# Patient Record
Sex: MALE | Race: WHITE | Employment: FULL TIME | URBAN - METROPOLITAN AREA
[De-identification: names, ages, dates, MRNs, and addresses within clinical notes are randomized per-mention and may not be internally consistent; named-entity substitution may affect disease eponyms.]

---

## 2017-11-27 ENCOUNTER — OFFICE VISIT (OUTPATIENT)
Dept: FAMILY MEDICINE | Facility: CLINIC | Age: 32
End: 2017-11-27
Payer: COMMERCIAL

## 2017-11-27 VITALS
HEIGHT: 75 IN | OXYGEN SATURATION: 97 % | HEART RATE: 101 BPM | TEMPERATURE: 98.8 F | SYSTOLIC BLOOD PRESSURE: 130 MMHG | DIASTOLIC BLOOD PRESSURE: 70 MMHG | BODY MASS INDEX: 25.48 KG/M2 | WEIGHT: 204.9 LBS

## 2017-11-27 DIAGNOSIS — J10.1 INFLUENZA A: ICD-10-CM

## 2017-11-27 DIAGNOSIS — R07.0 THROAT PAIN: Primary | ICD-10-CM

## 2017-11-27 LAB
FLUAV+FLUBV AG SPEC QL: NEGATIVE
FLUAV+FLUBV AG SPEC QL: POSITIVE
SPECIMEN SOURCE: ABNORMAL

## 2017-11-27 PROCEDURE — 99213 OFFICE O/P EST LOW 20 MIN: CPT | Performed by: HOSPITALIST

## 2017-11-27 PROCEDURE — 87804 INFLUENZA ASSAY W/OPTIC: CPT | Performed by: HOSPITALIST

## 2017-11-27 RX ORDER — OSELTAMIVIR PHOSPHATE 75 MG/1
75 CAPSULE ORAL 2 TIMES DAILY
Qty: 10 CAPSULE | Refills: 0 | Status: SHIPPED | OUTPATIENT
Start: 2017-11-27 | End: 2019-05-01

## 2017-11-27 NOTE — LETTER
November 27, 2017      To Whom It May Concern:      Jeffry Peoples was seen in our Emergency Department today, 11/27/17.  I expect his condition to improve over the next 2 days.  He may return to work when improved.    Sincerely,        Sixto Tovar MD

## 2017-11-27 NOTE — PROGRESS NOTES
"  SUBJECTIVE:   Jeffry Peoples is a 32 year old male who presents to clinic today for the following health issues:      Acute Illness   Acute illness concerns: Flu  Onset: 3 days     Fever: YES    Chills/Sweats: YES    Headache (location?): no    Sinus Pressure:no    Conjunctivitis:  no    Ear Pain: no    Rhinorrhea: no    Congestion: YES    Sore Throat: no     Cough: YES    Wheeze: no     Decreased Appetite: no     Nausea: no    Vomiting: no    Diarrhea:  YES    Dysuria/Freq.: no     Fatigue/Achiness: YES    Sick/Strep Exposure: no     Therapies Tried and outcome: Sudafed, IBU, dayquil         No Known Allergies    No past medical history on file.      No current outpatient prescriptions on file prior to visit.  No current facility-administered medications on file prior to visit.     Social History   Substance Use Topics     Smoking status: Former Smoker     Smokeless tobacco: Never Used     Alcohol use Yes       ROS:  Consitutional: As above  ENT: As above  Respiratory: As above    OBJECTIVE:  /70 (BP Location: Right arm, Patient Position: Chair, Cuff Size: Adult Large)  Pulse 101  Temp 98.8  F (37.1  C) (Oral)  Ht 6' 3.25\" (1.911 m)  Wt 204 lb 14.4 oz (92.9 kg)  SpO2 97%  BMI 25.44 kg/m2  GENERAL APPEARANCE: healthy, alert and moderate distress  EYES: conjunctiva clear  EARS:no cerumen.   Ear canals no erythema, TM's intact no erythema .    NOSE/MOUTH: Nose and mouth is normal, no erythema or lesions  THROAT: no erythema w/ no tonsillar enlargement . no exudates  NECK: supple, nontender, no lymphadenopathy  RESP: lungs clear to auscultation - no rales, rhonchi or wheezes  CV: regular rates and rhythm, normal S1 S2, no murmur noted  NEURO: awake, alert        Recent Results (from the past 168 hour(s))   Influenza A/B antigen    Collection Time: 11/27/17  4:15 PM   Result Value Ref Range    Influenza A/B Agn Specimen Nasal     Influenza A Positive (A) NEG^Negative    Influenza B Negative NEG^Negative "        ASSESSMENT:     ICD-10-CM    1. Throat pain R07.0 Influenza A/B antigen   2. Influenza A J10.1 oseltamivir (TAMIFLU) 75 MG capsule         PLAN:    Will start tamiflu,Tylenol and ibuprofen prn for  Pain or fever  Lots of rest and fluids.  Follow up in 3-7 days if not better or sooner if getting worse .    Sixto Tovar MD

## 2017-11-27 NOTE — MR AVS SNAPSHOT
"              After Visit Summary   2017    Jeffry Peoples    MRN: [id number]           Patient Information     Date Of Birth          1985        Visit Information        Provider Department      2017 3:30 PM Sixto Tovar MD [facility]        Today's Diagnoses     Throat pain    -  1    Influenza A           Follow-ups after your visit        Who to contact     If you have questions or need follow up information about today's clinic visit or your schedule please contact [facility] directly at [phone].  Normal or non-critical lab and imaging results will be communicated to you by [employer], letter or phone within 4 business days after the clinic has received the results. If you do not hear from us within 7 days, please contact the clinic through [employer] or phone. If you have a critical or abnormal lab result, we will notify you by phone as soon as possible.  Submit refill requests through [employer] or call your pharmacy and they will forward the refill request to us. Please allow 3 business days for your refill to be completed.          Additional Information About Your Visit        MyChart Information     [employer] lets you send messages to your doctor, view your test results, renew your prescriptions, schedule appointments and more. To sign up, go to [email or web address] . Click on \"Log in\" on the left side of the screen, which will take you to the Welcome page. Then click on \"Sign up Now\" on the right side of the page.     You will be asked to enter the access code listed below, as well as some personal information. Please follow the directions to create your username and password.     Your access code is: [email or web address]  Expires: 2018  4:51 PM     Your access code will  in 90 days. If you need help or a new code, please call your [facility] or [phone].        Care EveryWhere ID     This is your Care EveryWhere ID. This could be " "used by other organizations to access your [facility] medical records  [id number]        Your Vitals Were     Pulse Temperature Height Pulse Oximetry BMI (Body Mass Index)       101 98.8  F (37.1  C) (Oral) 6' 3.25\" (1.911 m) 97% 25.44 kg/m2        Blood Pressure from Last 3 Encounters:   11/27/17 130/70   08/30/16 133/83    Weight from Last 3 Encounters:   11/27/17 204 lb 14.4 oz (92.9 kg)              We Performed the Following     Influenza A/B antigen          Today's Medication Changes          These changes are accurate as of: 11/27/17  4:51 PM.  If you have any questions, ask your nurse or doctor.               Start taking these medicines.        Dose/Directions    oseltamivir 75 MG capsule   Commonly known as:  TAMIFLU   Used for:  Influenza A   Started by:  Sixto Tovar MD        Dose:  75 mg   Take 1 capsule (75 mg) by mouth 2 times daily   Quantity:  10 capsule   Refills:  0            Where to get your medicines      These medications were sent to [place] [id number] [place] [facility] [place] [id number] [facility]  [address] [facility] [address]     Phone:  [phone]     oseltamivir 75 MG capsule                Primary Care Provider Fax #    Physician No Ref-Primary [phone]       No address on file        Equal Access to Services     MIKEY CHUNG AH: Saqib perez Sosanket, waaxda luqadaha, qaybta kaalmada adeegyada, manolo martinez. So [facility] [phone].    ATENCIÓN: Si habla español, tiene a amaya disposición servicios gratuitos de asistencia lingüística. Llame al [phone].    We comply with applicable federal civil rights laws and [place] laws. We do not discriminate on the basis of race, color, national origin, age, disability, sex, sexual orientation, or gender identity.            Thank you!     Thank you for choosing [facility]  for your care. Our goal is always to provide you with excellent care. Hearing back from our " patients is one way we can continue to improve our services. Please take a few minutes to complete the written survey that you may receive in the mail after your visit with us. Thank you!             Your Updated Medication List - Protect others around you: Learn how to safely use, store and throw away your medicines at www.disposemymeds.org.          This list is accurate as of: 11/27/17  4:51 PM.  Always use your most recent med list.                   Brand Name Dispense Instructions for use Diagnosis    oseltamivir 75 MG capsule    TAMIFLU    10 capsule    Take 1 capsule (75 mg) by mouth 2 times daily    Influenza A

## 2017-11-27 NOTE — NURSING NOTE
"Chief Complaint   Patient presents with     URI       Initial /70 (BP Location: Right arm, Patient Position: Chair, Cuff Size: Adult Large)  Pulse 101  Temp 98.8  F (37.1  C) (Oral)  Ht 6' 3.25\" (1.911 m)  Wt 204 lb 14.4 oz (92.9 kg)  SpO2 97%  BMI 25.44 kg/m2 Estimated body mass index is 25.44 kg/(m^2) as calculated from the following:    Height as of this encounter: 6' 3.25\" (1.911 m).    Weight as of this encounter: 204 lb 14.4 oz (92.9 kg).  Medication Reconciliation: complete      Health Maintenance addressed:  NONE    N/a    ANTOINETTE Wade        "

## 2019-03-20 ENCOUNTER — OFFICE VISIT (OUTPATIENT)
Dept: FAMILY MEDICINE | Facility: CLINIC | Age: 34
End: 2019-03-20
Payer: COMMERCIAL

## 2019-03-20 VITALS
WEIGHT: 213.8 LBS | RESPIRATION RATE: 20 BRPM | DIASTOLIC BLOOD PRESSURE: 80 MMHG | OXYGEN SATURATION: 100 % | BODY MASS INDEX: 26.04 KG/M2 | HEIGHT: 76 IN | TEMPERATURE: 98 F | SYSTOLIC BLOOD PRESSURE: 134 MMHG | HEART RATE: 122 BPM

## 2019-03-20 DIAGNOSIS — M62.830 BACK MUSCLE SPASM: ICD-10-CM

## 2019-03-20 DIAGNOSIS — H81.12 BENIGN PAROXYSMAL POSITIONAL VERTIGO OF LEFT EAR: Primary | ICD-10-CM

## 2019-03-20 PROCEDURE — 99214 OFFICE O/P EST MOD 30 MIN: CPT | Performed by: NURSE PRACTITIONER

## 2019-03-20 RX ORDER — CYCLOBENZAPRINE HCL 5 MG
5 TABLET ORAL 3 TIMES DAILY PRN
Qty: 30 TABLET | Refills: 1 | Status: SHIPPED | OUTPATIENT
Start: 2019-03-20

## 2019-03-20 RX ORDER — MECLIZINE HYDROCHLORIDE 25 MG/1
25 TABLET ORAL 3 TIMES DAILY PRN
Qty: 30 TABLET | Refills: 0 | Status: SHIPPED | OUTPATIENT
Start: 2019-03-20

## 2019-03-20 ASSESSMENT — MIFFLIN-ST. JEOR: SCORE: 2017.32

## 2019-03-20 NOTE — PROGRESS NOTES
"  SUBJECTIVE:   Jeffry Peoples is a 33 year old male who presents to clinic today for the following health issues:    Dizziness      Duration: 6 days     Description   Feeling faint:  no   Feeling like the surroundings are moving: YES- when the dizziness occurs   Loss of consciousness or falls: no     Intensity:  Mild and intermittant     Accompanying signs and symptoms:   Nausea/vomitting: YES- off and on   Palpitations: no   Weakness in arms or legs: YES  Vision or speech changes: no   Ringing in ears (Tinnitus): no   Hearing loss related to dizziness: no   Other (fevers/chills/sweating/dyspnea): YES- sweating     History (similar episodes/head trauma/previous evaluation/recent bleeding): None    Precipitating or alleviating factors (new meds/chemicals): under a lot of stress   Worse with activity/head movement: YES- a little bit     Therapies tried and outcome: None    Had diarrhea last week for four days.   Friday afternoon had onset of vertigo at work.    Still feeling dizzy and weak, but no where near as bad as the first day.     L upper back pain: sees a chiro, told he has a \"rib out\".  Feels like a knot.  At times pain can be sharp.        Problem list and histories reviewed & adjusted, as indicated.  Additional history: as documented    Current Outpatient Medications   Medication Sig Dispense Refill     oseltamivir (TAMIFLU) 75 MG capsule Take 1 capsule (75 mg) by mouth 2 times daily (Patient not taking: Reported on 3/20/2019) 10 capsule 0     No Known Allergies    Reviewed and updated as needed this visit by clinical staff  Tobacco  Allergies  Meds  Problems  Med Hx  Surg Hx  Fam Hx  Soc Hx        Reviewed and updated as needed this visit by Provider         ROS:  Constitutional, HEENT, cardiovascular, pulmonary, gi and gu and neuro systems are negative, except as otherwise noted.    OBJECTIVE:     /80 (BP Location: Right arm, Patient Position: Sitting, Cuff Size: Adult Large)   Pulse 122  " " Temp 98  F (36.7  C) (Oral)   Resp 20   Ht 1.924 m (6' 3.75\")   Wt 97 kg (213 lb 12.8 oz)   SpO2 100%   BMI 26.20 kg/m    Body mass index is 26.2 kg/m .  GENERAL: healthy, alert and no distress  EYES: Eyes grossly normal to inspection, EOMI, PERRL and conjunctivae and sclerae normal  HENT: ear canals and TM's normal, nose and mouth without ulcers or lesions  NECK: no adenopathy, no asymmetry, masses, or scars and thyroid normal to palpation  RESP: lungs clear to auscultation - no rales, rhonchi or wheezes  CV: regular rate and rhythm, normal S1 S2, no S3 or S4, no murmur, click or rub  NEURO: mentation intact, speech normal, Romberg abnormal and L sided nystagmus   MS: tightness over the L upper trapezius muscle    Diagnostic Test Results:  none     ASSESSMENT/PLAN:   1. Benign paroxysmal positional vertigo of left ear  Explained eply maneuver.   Ensure plenty of rest.  If not resolving over the next 2 weeks consider having him see vestibular therapy.   - meclizine (ANTIVERT) 25 MG tablet; Take 1 tablet (25 mg) by mouth 3 times daily as needed for dizziness  Dispense: 30 tablet; Refill: 0    2. Back muscle spasm  Heat, NSAID.    - cyclobenzaprine (FLEXERIL) 5 MG tablet; Take 1 tablet (5 mg) by mouth 3 times daily as needed for muscle spasms  Dispense: 30 tablet; Refill: 1      CHIDI Stafford [facility]    "

## 2019-03-20 NOTE — NURSING NOTE
"Chief Complaint   Patient presents with     Dizziness     Initial /80 (BP Location: Right arm, Patient Position: Sitting, Cuff Size: Adult Large)   Pulse 122   Temp 98  F (36.7  C) (Oral)   Resp 20   Ht 1.924 m (6' 3.75\")   Wt 97 kg (213 lb 12.8 oz)   SpO2 100%   BMI 26.20 kg/m   Estimated body mass index is 26.2 kg/m  as calculated from the following:    Height as of this encounter: 1.924 m (6' 3.75\").    Weight as of this encounter: 97 kg (213 lb 12.8 oz).  BP completed using cuff size large right arm    Lisa Magill, CMA    "

## 2019-03-20 NOTE — PATIENT INSTRUCTIONS
Patient Education     Benign Paroxysmal Positional Vertigo    Benign paroxysmal positional vertigo is a common condition. You feel as if the room is spinning after changing position, moving your head quickly, or even just rolling over in bed.  Vertigo is a false feeling of motion plus disorientation that makes it seem as though the room is spinning. A vertigo attack may cause sudden nausea, vomiting, and heavy sweating. Severe vertigo causes a loss of balance. You may even fall down.  Vertigo is caused by a problem with the inner ear. The inner ear is located behind the middle ear. It is a part of the balance center of the body. It contains small calcium particles within fluid-filled canals (semi-circular canals). These particles can move out of position. This may happen as a result of aging, head injury, or disease of the inner ear. Once that happens, moving your head in certain ways may cause the particles to stimulate the inner ear. This creates the feeling of vertigo.  An episode of vertigo may last seconds, minutes, or hours. Once you are over the first episode of vertigo, it may never return. Sometimes symptoms return off and on for several weeks or longer.  Home care  Follow these guidelines when caring for yourself at home:    Rest quietly in bed if your symptoms are severe. Change position slowly. There is usually 1 position that will feel best. This might be lying on 1 side or lying on your back with your head slightly raised on pillows. Until you have no symptoms, you are at a higher risk of falling. Let someone help you when you get up. Get rid of home hazards such as loose electrical cords and throw rugs. Don t walk in unfamiliar areas that are not lighted. Use night lights in bathrooms and kitchen areas.    Do not drive or work with dangerous machinery for 1 week after symptoms go away. This is in case symptoms return suddenly.    Take medicine as prescribed to relieve your symptoms. Unless another  medicine was prescribed for nausea, vomiting, and vertigo, you may use over-the-counter motion sickness medicine. Examples of this include meclizine and dimenhydrinate.  Follow-up care  Follow up with your healthcare provider, or as directed. Tell your provider about any ringing in your ear or hearing loss.  If you had a CT or MRI scan, a specialist will review it. You will be told of any new findings that may affect your care.  When to seek medical advice  Call your healthcare provider right away if any of these occur:    Vertigo gets worse even after taking prescribed medicine    Repeated vomiting even after taking prescribed medicine    Weakness that gets worse    Fainting    Severe headache or unusual drowsiness or confusion    Weakness of an arm or leg or 1 side of the face    Trouble walking    Trouble with speech or vision    Seizure    Trouble hearing    Fever of 100.4 F (38 C) or higher, or as directed by your healthcare provider    Fast heart rate    Chest pain   Date Last Reviewed: 11/1/2017 2000-2018 [employer]. [address] [place] [address], [place] [address]. All rights reserved. This information is not intended as a substitute for professional medical care. Always follow your healthcare professional's instructions.

## 2019-04-27 ENCOUNTER — OFFICE VISIT (OUTPATIENT)
Dept: URGENT CARE | Facility: URGENT CARE | Age: 34
End: 2019-04-27
Payer: COMMERCIAL

## 2019-04-27 VITALS
DIASTOLIC BLOOD PRESSURE: 82 MMHG | BODY MASS INDEX: 25.94 KG/M2 | HEART RATE: 112 BPM | RESPIRATION RATE: 16 BRPM | OXYGEN SATURATION: 99 % | SYSTOLIC BLOOD PRESSURE: 126 MMHG | WEIGHT: 213 LBS | TEMPERATURE: 99 F | HEIGHT: 76 IN

## 2019-04-27 DIAGNOSIS — J01.90 ACUTE SINUSITIS WITH SYMPTOMS > 10 DAYS: ICD-10-CM

## 2019-04-27 DIAGNOSIS — R07.0 THROAT PAIN: Primary | ICD-10-CM

## 2019-04-27 LAB
DEPRECATED S PYO AG THROAT QL EIA: NORMAL
SPECIMEN SOURCE: NORMAL

## 2019-04-27 PROCEDURE — 99213 OFFICE O/P EST LOW 20 MIN: CPT | Performed by: FAMILY MEDICINE

## 2019-04-27 PROCEDURE — 87880 STREP A ASSAY W/OPTIC: CPT | Performed by: FAMILY MEDICINE

## 2019-04-27 PROCEDURE — 87081 CULTURE SCREEN ONLY: CPT | Performed by: FAMILY MEDICINE

## 2019-04-27 RX ORDER — AZITHROMYCIN 250 MG/1
TABLET, FILM COATED ORAL
Qty: 6 TABLET | Refills: 0 | Status: SHIPPED | OUTPATIENT
Start: 2019-04-27 | End: 2019-05-01

## 2019-04-27 ASSESSMENT — MIFFLIN-ST. JEOR: SCORE: 2013.69

## 2019-04-27 NOTE — PROGRESS NOTES
SUBJECTIVE: Jeffry Peoples is a 33 year old male patient complaining of sinus congestion for 10 day(s).     OBJECTIVE: The patient appears alert and mild distress.   EARS: External ears normal. Canals clear. TM's normal.  NOSE/SINUS: positive findings: mucosa erythematous and swollen  Sinus palpation: Maxillary sinus nontender to palpation   THROAT: marked erythema   NECK:positive findings: few small anterior cervical nodes   CHEST: Clear    ASSESSMENT: Acute Sinusitis; I think thsa all his symptoms are from sinus infection. Now with ST. Strep negative.     Muscle pain in his neck . This is leading to headaches by recruitment of muscle.    PLAN: See orders.   In addition, I have suggested that the patient   .  [place] and relafen

## 2019-04-28 LAB
BACTERIA SPEC CULT: NORMAL
SPECIMEN SOURCE: NORMAL

## 2019-05-01 ENCOUNTER — OFFICE VISIT (OUTPATIENT)
Dept: FAMILY MEDICINE | Facility: CLINIC | Age: 34
End: 2019-05-01
Payer: COMMERCIAL

## 2019-05-01 VITALS
DIASTOLIC BLOOD PRESSURE: 88 MMHG | HEART RATE: 124 BPM | SYSTOLIC BLOOD PRESSURE: 120 MMHG | WEIGHT: 213.8 LBS | TEMPERATURE: 98.9 F | BODY MASS INDEX: 26.2 KG/M2 | RESPIRATION RATE: 16 BRPM

## 2019-05-01 DIAGNOSIS — R00.0 RACING HEART BEAT: ICD-10-CM

## 2019-05-01 DIAGNOSIS — R42 DIZZINESS: Primary | ICD-10-CM

## 2019-05-01 DIAGNOSIS — J01.90 ACUTE SINUSITIS WITH SYMPTOMS > 10 DAYS: ICD-10-CM

## 2019-05-01 LAB
ERYTHROCYTE [DISTWIDTH] IN BLOOD BY AUTOMATED COUNT: 11.5 % (ref 10–15)
HCT VFR BLD AUTO: 47.2 % (ref 40–53)
HGB BLD-MCNC: 16.7 G/DL (ref 13.3–17.7)
MCH RBC QN AUTO: 33.7 PG (ref 26.5–33)
MCHC RBC AUTO-ENTMCNC: 35.4 G/DL (ref 31.5–36.5)
MCV RBC AUTO: 95 FL (ref 78–100)
PLATELET # BLD AUTO: 291 10E9/L (ref 150–450)
RBC # BLD AUTO: 4.96 10E12/L (ref 4.4–5.9)
WBC # BLD AUTO: 7.5 10E9/L (ref 4–11)

## 2019-05-01 PROCEDURE — 85027 COMPLETE CBC AUTOMATED: CPT | Performed by: NURSE PRACTITIONER

## 2019-05-01 PROCEDURE — 99214 OFFICE O/P EST MOD 30 MIN: CPT | Performed by: NURSE PRACTITIONER

## 2019-05-01 PROCEDURE — 84443 ASSAY THYROID STIM HORMONE: CPT | Performed by: NURSE PRACTITIONER

## 2019-05-01 PROCEDURE — 80053 COMPREHEN METABOLIC PANEL: CPT | Performed by: NURSE PRACTITIONER

## 2019-05-01 PROCEDURE — 36415 COLL VENOUS BLD VENIPUNCTURE: CPT | Performed by: NURSE PRACTITIONER

## 2019-05-01 ASSESSMENT — ANXIETY QUESTIONNAIRES
7. FEELING AFRAID AS IF SOMETHING AWFUL MIGHT HAPPEN: NEARLY EVERY DAY
6. BECOMING EASILY ANNOYED OR IRRITABLE: NOT AT ALL
5. BEING SO RESTLESS THAT IT IS HARD TO SIT STILL: NOT AT ALL
1. FEELING NERVOUS, ANXIOUS, OR ON EDGE: NEARLY EVERY DAY
2. NOT BEING ABLE TO STOP OR CONTROL WORRYING: NEARLY EVERY DAY
GAD7 TOTAL SCORE: 15
3. WORRYING TOO MUCH ABOUT DIFFERENT THINGS: NEARLY EVERY DAY

## 2019-05-01 ASSESSMENT — PATIENT HEALTH QUESTIONNAIRE - PHQ9
5. POOR APPETITE OR OVEREATING: NEARLY EVERY DAY
SUM OF ALL RESPONSES TO PHQ QUESTIONS 1-9: 3

## 2019-05-01 NOTE — PROGRESS NOTES
SUBJECTIVE:   Jeffry Peoples is a 33 year old male who presents to clinic today for the following   health issues:      Dizziness  Onset: ongoing     Description:   Do you feel faint:  no   Does it feel like the surroundings (bed, room) are moving: no   Unsteady/off balance: YES  Have you passed out or fallen: no     Intensity: moderate    Progression of Symptoms:  waxing and waning    Accompanying Signs & Symptoms:  Heart palpitations: no   Nausea, vomiting: YES- nausea  Weakness in arms or legs: YES  Fatigue: YES  Vision or speech changes: no   Ringing in ears (Tinnitus): no   Hearing Loss: no     History:   Head trauma/concussion hx: no   Previous similar symptoms: YES  Recent bleeding history: no     Precipitating factors:   Worse with activity or head movement: YES  Any new medications (BP?): no   Alcohol/drug abuse/withdrawal: no     Alleviating factors:   Does staying in a fixed position give relief:  YES    Therapies Tried and outcome: meclizine-does NOT really help    Seen on 4/27/2019 in  for sinusitis.  He was treated with a zpak.    He feels better after completing antibiotics, but not fully better.    Feels like he has fluid in his L ear.    He had neck pain initially and this is better with nsaid.  He gets pressure/dull headache in the front of the head/forehead area.     When he gets dizzy he starts to feel nauseated.   Two weeks ago he felt like dizziness/vertigo was fully resolved and then sinus symptoms started 2 weeks ago.   Dizzy spells are making him feel anxious.   Dizziness is making him worried about his health.  When he gets worried he feels his heart race and starts to fear the worst.   Has been stressed lately.  Recent promotion at work [place].  Dad had a heart attack in his 40s and has a hx of thyroid disease.       NJ-7 SCORE 5/1/2019   Total Score 15     PHQ-9 SCORE 5/1/2019   PHQ-9 Total Score 3       Additional history: as documented    Reviewed   and updated as needed this visit by clinical staff  Allergies  Meds         Reviewed and updated as needed this visit by Provider         Current Outpatient Medications   Medication Sig Dispense Refill     meclizine (ANTIVERT) 25 MG tablet Take 1 tablet (25 mg) by mouth 3 times daily as needed for dizziness 30 tablet 0     nabumetone (RELAFEN) 750 MG tablet Take 1 tablet (750 mg) by mouth 2 times daily 20 tablet 0     cyclobenzaprine (FLEXERIL) 5 MG tablet Take 1 tablet (5 mg) by mouth 3 times daily as needed for muscle spasms 30 tablet 1     oseltamivir (TAMIFLU) 75 MG capsule Take 1 capsule (75 mg) by mouth 2 times daily (Patient not taking: Reported on 4/27/2019) 10 capsule 0     No Known Allergies    ROS:  Constitutional, HEENT, cardiovascular, pulmonary, gi and gu, neuro systems are negative, except as otherwise noted.    OBJECTIVE:     /88 (BP Location: Right arm, Patient Position: Sitting, Cuff Size: Adult Large)   Pulse 124   Temp 98.9  F (37.2  C) (Oral)   Resp 16   Wt 97 kg (213 lb 12.8 oz)   BMI 26.20 kg/m    Body mass index is 26.2 kg/m .  GENERAL: healthy, alert and no distress, here with spouse  EYES: Eyes grossly normal to inspection, EOMI, PERRL and conjunctivae and sclerae normal  HENT: ear canals and TM's normal, nose and mouth without ulcers or lesions, nasal mucosa erythematous/swollen, mild frontal sinus tenderness bilat, bumps in OP  NECK: no adenopathy, no asymmetry, masses, or scars and thyroid normal to palpation  RESP: lungs clear to auscultation - no rales, rhonchi or wheezes  CV: regular rate and rhythm, normal S1 S2, no S3 or S4, no murmur, click or rub  MS: no gross musculoskeletal defects noted, no edema  SKIN: warm and dry  NEURO: Normal strength and tone and mentation intact  PSYCH: mentation appears normal, affect normal/bright, anxious      Diagnostic Test Results:  none     ASSESSMENT/PLAN:   1. Dizziness  Suspect this is secondary to sinusitis/anxiety.  If symptoms  persists after completing course of antibiotics will have him see therapists.   - PHYSICAL THERAPY REFERRAL; Future    2. Acute sinusitis with symptoms > 10 days  Partially resolved after zpak.  Will treat with augmentin.    - amoxicillin-clavulanate (AUGMENTIN) 875-125 MG tablet; Take 1 tablet by mouth 2 times daily for 10 days  Dispense: 20 tablet; Refill: 0    3. Racing heart beat  Suspect this is related to anxiety.  Will check labs today to rule out other causes.    - CBC with platelets  - Comprehensive metabolic panel  - TSH with free T4 reflex    F/u 4 wks    CHIDI Stafford [facility]

## 2019-05-01 NOTE — PATIENT INSTRUCTIONS
Start antibiotics.  If dizziness persists follow up with therapists.  If symptoms are not improving return for follow up.

## 2019-05-01 NOTE — NURSING NOTE
"Chief Complaint   Patient presents with     Dizziness     Initial /88 (BP Location: Right arm, Patient Position: Sitting, Cuff Size: Adult Large)   Pulse 124   Temp 98.9  F (37.2  C) (Oral)   Resp 16   Wt 97 kg (213 lb 12.8 oz)   BMI 26.20 kg/m   Estimated body mass index is 26.2 kg/m  as calculated from the following:    Height as of 4/27/19: 1.924 m (6' 3.75\").    Weight as of this encounter: 97 kg (213 lb 12.8 oz).  BP completed using cuff size large right arm    Lisa Magill, CMA    "

## 2019-05-02 LAB
ALBUMIN SERPL-MCNC: 4.9 G/DL (ref 3.4–5)
ALP SERPL-CCNC: 57 U/L (ref 40–150)
ALT SERPL W P-5'-P-CCNC: 45 U/L (ref 0–70)
ANION GAP SERPL CALCULATED.3IONS-SCNC: 8 MMOL/L (ref 3–14)
AST SERPL W P-5'-P-CCNC: 24 U/L (ref 0–45)
BILIRUB SERPL-MCNC: 0.8 MG/DL (ref 0.2–1.3)
BUN SERPL-MCNC: 16 MG/DL (ref 7–30)
CALCIUM SERPL-MCNC: 9.4 MG/DL (ref 8.5–10.1)
CHLORIDE SERPL-SCNC: 105 MMOL/L (ref 94–109)
CO2 SERPL-SCNC: 26 MMOL/L (ref 20–32)
CREAT SERPL-MCNC: 1.39 MG/DL (ref 0.66–1.25)
GFR SERPL CREATININE-BSD FRML MDRD: 66 ML/MIN/{1.73_M2}
GLUCOSE SERPL-MCNC: 100 MG/DL (ref 70–99)
POTASSIUM SERPL-SCNC: 4.1 MMOL/L (ref 3.4–5.3)
PROT SERPL-MCNC: 8.4 G/DL (ref 6.8–8.8)
SODIUM SERPL-SCNC: 139 MMOL/L (ref 133–144)
TSH SERPL DL<=0.005 MIU/L-ACNC: 1.17 MU/L (ref 0.4–4)

## 2019-05-02 ASSESSMENT — ANXIETY QUESTIONNAIRES: GAD7 TOTAL SCORE: 15

## 2019-05-08 ENCOUNTER — HOSPITAL ENCOUNTER (OUTPATIENT)
Dept: PHYSICAL THERAPY | Facility: CLINIC | Age: 34
End: 2019-05-08
Attending: NURSE PRACTITIONER
Payer: COMMERCIAL

## 2019-05-08 DIAGNOSIS — R42 DIZZINESS: Primary | ICD-10-CM

## 2019-05-08 DIAGNOSIS — R26.89 BALANCE PROBLEMS: ICD-10-CM

## 2019-05-08 PROCEDURE — 97162 PT EVAL MOD COMPLEX 30 MIN: CPT | Mod: GP | Performed by: PHYSICAL THERAPIST

## 2019-05-08 PROCEDURE — 97112 NEUROMUSCULAR REEDUCATION: CPT | Mod: GP | Performed by: PHYSICAL THERAPIST

## 2019-05-08 PROCEDURE — 97110 THERAPEUTIC EXERCISES: CPT | Mod: GP | Performed by: PHYSICAL THERAPIST

## 2019-05-08 NOTE — PROGRESS NOTES
"   05/08/19 1500   Quick Adds   Quick Adds Vestibular Eval   Type of Visit Initial OP PT Evaluation   General Information   Start of Care Date 05/08/19   Referring Physician Mere Vaughn APRN CNP   Orders Evaluate and Treat as Indicated   Order Date 05/01/19   Medical Diagnosis Dizziness   Onset of illness/injury or Date of Surgery 03/15/19   Surgical/Medical history reviewed Yes   Pertinent history of current problem (include personal factors and/or comorbidities that impact the POC) Patient is a 33 y.o. male referred to physical therapy to eval and treat dizziness. Patient reporting that his symptoms began ~ 2 months ago; patient reports that he had the flu for 4-5 days.  Friday he felt better and was able to go to work, however, in the afternoon he got up from his desk and \"had vertigo\" and \"along came with it some anxiety, a rush.\"   Patient has not experienced vertigo again, but states dizziness for two week \"it feels like a rush\" and lightheaded; describes as \"on and off\" and that it is a \"short burst.\"  Patient saw a provider on 3/20/2019 and was given meclizine which did not help.  After two weeks of dizziness he then had two weeks of feeling better, but now is noticing more episodes of dizziness (recently at urgent care for sinusitis).  Patient reporting that dizziness comes on with stress; has it as work and not really at home.  Patient reports that he had a high stress job, promotion, and move in works.  Describes the current dizziness as \"lightheadedness\" and feels \"wobbly\" and that \"it comes and goes in a short burst.\"  Patient reporting that he does not associate the dizziness with any particular movement and that he notices it more at work and when stressed.  Patient reporting that he has a high stress job with a recent job promotion and will have to move soon because of it.  Patient reporting increased anxiety because of his dizziness symptoms.  Patient reporting no vision or hearing " changes.   Pertinent Visual History  contacts   Prior level of function comment active and independent, not regularly exercising   Current Community Support Family/friend caregiver  (wife and two boys)   Patient role/Employment history Employed   Living environment [facility]   Current Assistive Devices   (None)   ADL Devices   (None)   Patient/Family Goals Statement figure out what is causing the dizziness   Fall Risk Screen   Fall screen completed by PT   Have you fallen 2 or more times in the past year? No   Have you fallen and had an injury in the past year? No   Is patient a fall risk? Yes   Fall screen comments Elevated risk for falls when dizziness symptoms present.   Abuse Screen (yes response referral indicated)   Feels Unsafe at Home or Work/School no   Feels Threatened by Someone no   Does Anyone Try to Keep You From Having Contact with Others or Doing Things Outside Your Home? no   Physical Signs of Abuse Present no   Functional Scales   Functional Scales and Outcomes DHI: 24/100   Pain   Patient currently in pain No   Pain comments later in treatment session patient reporting some recent neck pain   Cognitive Status Examination   Orientation orientation to person, place and time   Level of Consciousness alert   Follows Commands and Answers Questions 100% of the time   Personal Safety and Judgment intact   Memory intact   Integumentary   Integumentary No deficits were identified   Posture   Posture Normal   Palpation   Palpation Increased myofascial tightness bilateral shoulder and cervical muscles   Range of Motion (ROM)   ROM Comment Not formally tested; bilateral upper and lower extremities WNL for tasks performed   Strength   Strength Comments Not formally tested; bilateral upper and lower extremities WNL for tasks performed   Bed Mobility   Bed Mobility Comments Reports independence   Transfer Skills   Transfer Comments Independent   Gait   Gait Comments Patient demonstrating ability to ambulate  "around clinic space independently; postural instability observed (patient able to self-correct independently) when performing VOR x 1 dynamic (with repetition of task, less postural instability observed)   Balance   Balance Comments Not formally assessed; postural instability observed with VOR x 1 dynamic   Sensory Examination   Sensory Perception Comments No deficits per patient   Cervicogenic Screen   Neck ROM WNL and symmetrical; some discomfort (\"pinch\") end range left cervical range of motion   Vertebral Artery Test Normal   Oculomotor Exam   Smooth Pursuit Normal   Saccades Normal   VOR Normal   Rapid Head Thrust Normal   Infrared Goggle Exam or Frenzel Lense Exam   Vestibular Suppressant in Last 24 Hours? No   Exam completed with Infrared Goggles   Spontaneous Nystagmus Negative   Gaze Evoked Nystagmus Negative   Head Shake Horizontal Nystagmus Negative   Elderton-Hallpike (right) Negative   Eld[place]-Hallpike (right) comments reported some tingling in right hand/upper extremity   Matt-Hallpike (Left) Negative   HSCC Supine Roll Test (Right) Negative   HSCC Supine Roll Test (Left) Negative   Dynamic Visual Acuity (DVA)   Static Acuity (LogMar) 11   Horizontal Head Movement at 1 Hz (LogMar) 11   Horizontal Head Movement at 2 Hz (LogMar) 9   DVA Comments guarded with head movement at 2hz; no dizziness but patient reporting difficulty focusing on letters   Planned Therapy Interventions   Planned Therapy Interventions balance training;neuromuscular re-education;ROM;stretching;strengthening  (vestibular rehab)   Clinical Impression   Criteria for Skilled Therapeutic Interventions Met yes, treatment indicated   PT Diagnosis Dizziness   Influenced by the following impairments dizziness, anxiety, stress, neck pain (mild), cervical and scapular muscle tightness bilateral   Functional limitations due to impairments Elevated risk for increased dizziness symptoms resulting in decreased activity tolerance   Clinical Presentation " Evolving/Changing   Clinical Presentation Rationale Based on current presentation, PMH, and social support.   Clinical Decision Making (Complexity) Moderate complexity   Therapy Frequency 1 time/week  (decrease frequency as needed)   Predicted Duration of Therapy Intervention (days/wks) 60 days   Risk & Benefits of therapy have been explained Yes   Patient, Family & other staff in agreement with plan of care Yes   Clinical Impression Comments Patient presenting today with complaints of dizziness with patient correlating episodes of dizziness with stress; unable to provide any specific activities or movements that bring on the dizziness.  With DVA, patient reporting difficulty reading letters at 2Hz but no dizziness.  Patient demonstrating some postural instability and reporting difficulty when performing dynamic VOR x 1 (no dizziness); able to perform VOR x 1 static busy background without postural instability or onset of dizziness.  Patient will benefit from skilled physical therapy to further assess balance and dizziness symptoms to determine appropriate treatment plan (habitation exercises, education regarding link between dizziness and anxiety, address neck pain and muscle tightness) in order to return patient to prior level of function.   Education Assessment   Preferred Learning Style Listening;Demonstration;Pictures/video   Barriers to Learning No barriers   GOALS   PT Eval Goals 1   Goal 1   Goal Identifier DHI   Goal Description The patient will score < 4/100 on the DHI to demonstrate a significant improvement in dizziness symptoms severity.   Target Date 07/06/19   Goal 2   Goal Identifier Postural Stability   Goal Description Patient will be able to ambulate without significant postural instability while performing head turns in order to minimize risk for falls and improve tolerance for mobility tasks.   Target Date 07/06/19   Total Evaluation Time   PT Eval, Moderate Complexity Minutes (23824) 25

## 2019-05-22 ENCOUNTER — OFFICE VISIT (OUTPATIENT)
Dept: FAMILY MEDICINE | Facility: CLINIC | Age: 34
End: 2019-05-22
Payer: COMMERCIAL

## 2019-05-22 VITALS
RESPIRATION RATE: 16 BRPM | DIASTOLIC BLOOD PRESSURE: 80 MMHG | SYSTOLIC BLOOD PRESSURE: 132 MMHG | HEART RATE: 132 BPM | BODY MASS INDEX: 25.96 KG/M2 | TEMPERATURE: 98.3 F | WEIGHT: 211.9 LBS

## 2019-05-22 DIAGNOSIS — F41.1 GAD (GENERALIZED ANXIETY DISORDER): Primary | ICD-10-CM

## 2019-05-22 PROCEDURE — 99214 OFFICE O/P EST MOD 30 MIN: CPT | Performed by: FAMILY MEDICINE

## 2019-05-22 RX ORDER — LORAZEPAM 0.5 MG/1
0.5 TABLET ORAL EVERY 6 HOURS PRN
Qty: 20 TABLET | Refills: 0 | Status: SHIPPED | OUTPATIENT
Start: 2019-05-22 | End: 2019-06-24

## 2019-05-22 RX ORDER — ESCITALOPRAM OXALATE 10 MG/1
10 TABLET ORAL DAILY
Qty: 30 TABLET | Refills: 1 | Status: SHIPPED | OUTPATIENT
Start: 2019-05-22 | End: 2019-06-24

## 2019-05-22 ASSESSMENT — ANXIETY QUESTIONNAIRES
1. FEELING NERVOUS, ANXIOUS, OR ON EDGE: NEARLY EVERY DAY
5. BEING SO RESTLESS THAT IT IS HARD TO SIT STILL: NOT AT ALL
IF YOU CHECKED OFF ANY PROBLEMS ON THIS QUESTIONNAIRE, HOW DIFFICULT HAVE THESE PROBLEMS MADE IT FOR YOU TO DO YOUR WORK, TAKE CARE OF THINGS AT HOME, OR GET ALONG WITH OTHER PEOPLE: SOMEWHAT DIFFICULT
3. WORRYING TOO MUCH ABOUT DIFFERENT THINGS: MORE THAN HALF THE DAYS
6. BECOMING EASILY ANNOYED OR IRRITABLE: MORE THAN HALF THE DAYS
7. FEELING AFRAID AS IF SOMETHING AWFUL MIGHT HAPPEN: NEARLY EVERY DAY
GAD7 TOTAL SCORE: 15
2. NOT BEING ABLE TO STOP OR CONTROL WORRYING: NEARLY EVERY DAY

## 2019-05-22 ASSESSMENT — PATIENT HEALTH QUESTIONNAIRE - PHQ9
SUM OF ALL RESPONSES TO PHQ QUESTIONS 1-9: 9
5. POOR APPETITE OR OVEREATING: MORE THAN HALF THE DAYS

## 2019-05-22 NOTE — PROGRESS NOTES
"Subjective     Jeffry Peoples is a 33 year old male who presents to clinic today for the following health issues:    HPI   Dizziness  Onset: months     Description:   Do you feel faint:  no   Does it feel like the surroundings (bed, room) are moving: no   Unsteady/off balance: YES- off and on   Have you passed out or fallen: no     Intensity: mild    Progression of Symptoms:  same    Accompanying Signs & Symptoms:  Heart palpitations: no   Nausea, vomiting: YES- nausea   Weakness in arms or legs: YES- both   Fatigue: YES  Vision or speech changes: no   Ringing in ears (Tinnitus): no   Hearing Loss: no     History:   Head trauma/concussion hx: no   Previous similar symptoms: YES  Recent bleeding history: no     Precipitating factors:   Worse with activity or head movement: YES  Any new medications (BP?): no   Alcohol/drug abuse/withdrawal: no     Alleviating factors:   Does staying in a fixed position give relief:  YES-off and on.  It depends     Therapies Tried and outcome: meclizine-did NOT work.      {additonal problems for provider to add (Optional):751578}    {HIST REVIEW/ LINKS 2 (Optional):646128}    Reviewed and updated as needed this visit by Provider         Review of Systems   {ROS COMP (Optional):247563}      Objective    There were no vitals taken for this visit.  There is no height or weight on file to calculate BMI.  Physical Exam   {Exam List (Optional):413054}    {Diagnostic Test Results (Optional):131369::\"Diagnostic Test Results:\",\"Labs reviewed in Epic\"}        {PROVIDER CHARTING PREFERENCE:345911}    "

## 2019-05-22 NOTE — PROGRESS NOTES
HPI  Dizziness  Onset: months     Description:   Do you feel faint:  no   Does it feel like the surroundings (bed, room) are moving: no   Unsteady/off balance: YES- off and on   Have you passed out or fallen: no     Intensity: mild    Progression of Symptoms:  same    Accompanying Signs & Symptoms:  Heart palpitations: no   Nausea, vomiting: YES- nausea   Weakness in arms or legs: YES- both   Fatigue: YES  Vision or speech changes: no   Ringing in ears (Tinnitus): no   Hearing Loss: no     History:   Head trauma/concussion hx: no   Previous similar symptoms: YES  Recent bleeding history: no     Precipitating factors:   Worse with activity or head movement: YES  Any new medications (BP?): no   Alcohol/drug abuse/withdrawal: no     Alleviating factors:   Does staying in a fixed position give relief:  YES-off and on.  It depends     Therapies Tried and outcome: meclizine-did NOT work.      Feeling discrete episodes, having one now.  Hands get clammy, legs feels weak.  Links initial episode to a severe case of gastroenteritis in Februrary, but had some dizziness/vertigo following this.  Did resolve with time, but reoccurred with a sinus infection.  Dizziness seems to have resolved, but is worried.  Does concede that he feels a lot of anxiety, the more he thinks about it the worse it seem to get.  Back and neck feel tight.  Feels like he has to focus on breathing.  No history of anxiety or depression.  Notes that fraternal twin brother did struggle with depression.  Seems like mouth is dry all the time.  Feels like he needs to sleep more, but wakes often during the night and will have trouble falling back asleep.  Notes the things he did to relax don't really help.  Has stopped all alcohol and caffeine.  Does not exercise.  Notes that he always feels at least a bit anxious, but often flares.  Tends to be more bothersome at work.  Didn't used to be a worried, but feels that he is now.  No rumination but always is worrying  about something.     Recently got promotion at work, selling house and moving to [place].  Seen with wife.    Review of Systems   Constitutional: Positive for diaphoresis and malaise/fatigue.   Respiratory: Positive for shortness of breath.    Cardiovascular: Negative for palpitations.   Neurological: Positive for dizziness.   Psychiatric/Behavioral: Negative for depression, substance abuse and suicidal ideas. The patient is nervous/anxious and has insomnia.          Physical Exam   Constitutional: He is oriented to person, place, and time. He appears well-developed and well-nourished.   Neurological: He is alert and oriented to person, place, and time.   Skin: Skin is warm.   Psychiatric: His behavior is normal. Thought content normal. His mood appears anxious.     (F41.1) NJ (generalized anxiety disorder)  (primary encounter diagnosis)  Comment: did recently have a fairly broad lab work up and everything was normal.  I do think he's having some new anxiety with brief panic attacks.  Willing to start on medication, will also provide a few ativan for bridging/acute use.  Advised to look into counseling, but with move coming soon this may need to wait until after they relocate.  Plan: LORazepam (ATIVAN) 0.5 MG tablet, escitalopram         (LEXAPRO) 10 MG tablet              RTC in 2w    Cleve Nieves MD

## 2019-05-22 NOTE — NURSING NOTE
"Chief Complaint   Patient presents with     Fatigue     Dizziness     Initial /80 (BP Location: Right arm, Patient Position: Sitting, Cuff Size: Adult Large)   Pulse 132   Temp 98.3  F (36.8  C) (Oral)   Resp 16   Wt 96.1 kg (211 lb 14.4 oz)   BMI 25.96 kg/m   Estimated body mass index is 25.96 kg/m  as calculated from the following:    Height as of 4/27/19: 1.924 m (6' 3.75\").    Weight as of this encounter: 96.1 kg (211 lb 14.4 oz).  BP completed using cuff size large right arm    Lisa Magill, CMA    "

## 2019-05-23 ASSESSMENT — ENCOUNTER SYMPTOMS
NERVOUS/ANXIOUS: 1
PALPITATIONS: 0
INSOMNIA: 1
DEPRESSION: 0
DIAPHORESIS: 1
DIZZINESS: 1
SHORTNESS OF BREATH: 1

## 2019-05-23 ASSESSMENT — LIFESTYLE VARIABLES: SUBSTANCE_ABUSE: 0

## 2019-05-23 ASSESSMENT — ANXIETY QUESTIONNAIRES: GAD7 TOTAL SCORE: 15

## 2019-05-28 PROBLEM — F41.1 GAD (GENERALIZED ANXIETY DISORDER): Status: ACTIVE | Noted: 2019-05-28

## 2019-06-24 ENCOUNTER — OFFICE VISIT (OUTPATIENT)
Dept: FAMILY MEDICINE | Facility: CLINIC | Age: 34
End: 2019-06-24
Payer: COMMERCIAL

## 2019-06-24 VITALS
TEMPERATURE: 98.5 F | HEART RATE: 104 BPM | WEIGHT: 212 LBS | SYSTOLIC BLOOD PRESSURE: 142 MMHG | HEIGHT: 76 IN | BODY MASS INDEX: 25.82 KG/M2 | RESPIRATION RATE: 18 BRPM | OXYGEN SATURATION: 99 % | DIASTOLIC BLOOD PRESSURE: 84 MMHG

## 2019-06-24 DIAGNOSIS — F41.1 GAD (GENERALIZED ANXIETY DISORDER): ICD-10-CM

## 2019-06-24 PROCEDURE — 99214 OFFICE O/P EST MOD 30 MIN: CPT | Performed by: NURSE PRACTITIONER

## 2019-06-24 RX ORDER — ESCITALOPRAM OXALATE 10 MG/1
15 TABLET ORAL DAILY
Qty: 135 TABLET | Refills: 0 | Status: SHIPPED | OUTPATIENT
Start: 2019-06-24 | End: 2019-09-30

## 2019-06-24 RX ORDER — LORAZEPAM 0.5 MG/1
0.5 TABLET ORAL EVERY 8 HOURS PRN
Qty: 20 TABLET | Refills: 0 | Status: SHIPPED | OUTPATIENT
Start: 2019-06-24

## 2019-06-24 ASSESSMENT — ANXIETY QUESTIONNAIRES
IF YOU CHECKED OFF ANY PROBLEMS ON THIS QUESTIONNAIRE, HOW DIFFICULT HAVE THESE PROBLEMS MADE IT FOR YOU TO DO YOUR WORK, TAKE CARE OF THINGS AT HOME, OR GET ALONG WITH OTHER PEOPLE: SOMEWHAT DIFFICULT
GAD7 TOTAL SCORE: 10
5. BEING SO RESTLESS THAT IT IS HARD TO SIT STILL: NOT AT ALL
1. FEELING NERVOUS, ANXIOUS, OR ON EDGE: MORE THAN HALF THE DAYS
7. FEELING AFRAID AS IF SOMETHING AWFUL MIGHT HAPPEN: SEVERAL DAYS
6. BECOMING EASILY ANNOYED OR IRRITABLE: SEVERAL DAYS
3. WORRYING TOO MUCH ABOUT DIFFERENT THINGS: MORE THAN HALF THE DAYS
2. NOT BEING ABLE TO STOP OR CONTROL WORRYING: MORE THAN HALF THE DAYS

## 2019-06-24 ASSESSMENT — MIFFLIN-ST. JEOR: SCORE: 2009.16

## 2019-06-24 ASSESSMENT — PATIENT HEALTH QUESTIONNAIRE - PHQ9: 5. POOR APPETITE OR OVEREATING: MORE THAN HALF THE DAYS

## 2019-06-24 NOTE — PROGRESS NOTES
"Subjective     Jeffry Peoples is a 33 year old male who presents to clinic today for the following health issues:    History of Present Illness        Mental Health Follow-up:  Patient presents to follow-up on Anxiety.    Patient's anxiety since last visit has been:  Medium  The patient is not having other symptoms associated with anxiety.  Any significant life events: job concerns, housing concerns and health concerns  Patient is feeling anxious or having panic attacks.  Patient has no concerns about alcohol or drug use.     Social History  Tobacco Use    Smoking status: Former Smoker    Smokeless tobacco: Never Used  Alcohol use: Yes  Drug use: No      Today's PHQ-9         PHQ-9 Total Score:         PHQ-9 Q9 Thoughts of better off dead/self-harm past 2 weeks :       Thoughts of suicide or self harm:      Self-harm Plan:        Self-harm Action:          Safety concerns for self or others:            NJ-7 SCORE 5/1/2019 5/22/2019 6/24/2019   Total Score 15 15 10     PHQ-9 SCORE 5/1/2019 5/22/2019   PHQ-9 Total Score 3 9           Social History     Tobacco Use     Smoking status: Former Smoker     Smokeless tobacco: Never Used   Substance Use Topics     Alcohol use: Yes     Drug use: No     NJ-7 SCORE 5/1/2019 5/22/2019 6/24/2019   Total Score 15 15 10     PHQ 5/1/2019 5/22/2019   PHQ-9 Total Score 3 9   Q9: Thoughts of better off dead/self-harm past 2 weeks Not at all Not at all         Amount of exercise or physical activity: None    Problems taking medications regularly: No    Medication side effects: sweating (not sure if that is a side effect)    Diet: regular (no restrictions)    Was seen by Dr. Nieves last month and started on 10 mg lexapro for anxiety.  He was also given small supply of ativan to help bridge.  Since starting medication he feels like things have started to improve.  He feels \"like a normal human\" on the weekends and then more stress and anxiety during the week.  Much of it is related " "to work, new job, and upcoming move due to new job.  Plans to put his house on the market in the next week.  Thinking they will move in the next month.  In the beginning he was taking 2 tabs of ativan daily, then daily and then more prn.  He is now out of this.  He reports this worked well for the episodes of increased anxiety.  Had an upset stomach when he started on lexapro, but this has improved.  Notices he sweats more, but not bothersome.            Current Outpatient Medications   Medication Sig Dispense Refill     cyclobenzaprine (FLEXERIL) 5 MG tablet Take 1 tablet (5 mg) by mouth 3 times daily as needed for muscle spasms 30 tablet 1     escitalopram (LEXAPRO) 10 MG tablet Take 1.5 tablets (15 mg) by mouth daily 135 tablet 0     LORazepam (ATIVAN) 0.5 MG tablet Take 1 tablet (0.5 mg) by mouth every 8 hours as needed for anxiety 20 tablet 0     meclizine (ANTIVERT) 25 MG tablet Take 1 tablet (25 mg) by mouth 3 times daily as needed for dizziness (Patient not taking: Reported on 5/22/2019) 30 tablet 0     No Known Allergies    Reviewed and updated as needed this visit by Provider  Tobacco  Allergies  Meds  Problems  Med Hx  Surg Hx  Fam Hx         Review of Systems   ROS COMP: Constitutional, HEENT, cardiovascular, pulmonary, gi and gu and psych systems are negative, except as otherwise noted.      Objective    /84   Pulse 104   Temp 98.5  F (36.9  C) (Oral)   Resp 18   Ht 1.924 m (6' 3.75\")   Wt 96.2 kg (212 lb)   SpO2 99%   BMI 25.98 kg/m    Body mass index is 25.98 kg/m .  Physical Exam   GENERAL: healthy, alert and no distress  PSYCH: mentation appears normal, affect normal/bright    Diagnostic Test Results:  none         Assessment & Plan     1. NJ (generalized anxiety disorder)  Improving.  Will bump up to 15 mg daily, monitor side effects.  May cause more sweating.  PRN use of ativan; risk/benefits/side effects reviewed.  If still here in 2 mos return for follow up; otherwise " establish with local provider once moved for additional refills.   - LORazepam (ATIVAN) 0.5 MG tablet; Take 1 tablet (0.5 mg) by mouth every 8 hours as needed for anxiety  Dispense: 20 tablet; Refill: 0  - escitalopram (LEXAPRO) 10 MG tablet; Take 1.5 tablets (15 mg) by mouth daily  Dispense: 135 tablet; Refill: 0       Return in about 2 months (around 8/24/2019) for anxiety .    CHIDI Stafford [facility]

## 2019-06-25 ASSESSMENT — ANXIETY QUESTIONNAIRES: GAD7 TOTAL SCORE: 10

## 2019-07-10 DIAGNOSIS — F41.1 GAD (GENERALIZED ANXIETY DISORDER): ICD-10-CM

## 2019-07-10 RX ORDER — ESCITALOPRAM OXALATE 10 MG/1
TABLET ORAL
Qty: 30 TABLET | Refills: 1 | OUTPATIENT
Start: 2019-07-10

## 2019-07-10 NOTE — TELEPHONE ENCOUNTER
"PATIENT OUT LEAVING FOR OUT OF TOWN TODAY, NEEDS APPROVED AND SENT TO PHARMACY BEFORE 1200 TODAY.     Last Written Prescription Date: 06/24/19Last Fill Quantity: 135,  # refills: 0   Last office visit: 6/24/2019 with prescribing provider:  JENNIFER COLLAZO    Future Office Visit:      Requested Prescriptions   Pending Prescriptions Disp Refills     escitalopram (LEXAPRO) 10 MG tablet [Pharmacy Med Name: ESCITALOPRAM 10 MG TABLET] 30 tablet 1     Sig: TAKE 1 TABLET BY MOUTH EVERY DAY       SSRIs Protocol Passed - 7/10/2019  9:22 AM        Passed - Recent (12 mo) or future (30 days) visit within the authorizing provider's specialty     Patient had office visit in the last 12 months or has a visit in the next 30 days with authorizing provider or within the authorizing provider's specialty.  See \"Patient Info\" tab in inbasket, or \"Choose Columns\" in Meds & Orders section of the refill encounter.              Passed - Medication is active on med list        Passed - Patient is age 18 or older          "

## 2019-07-24 NOTE — PROGRESS NOTES
Outpatient Physical Therapy Discharge Note     Patient: Jeffry Peoples  : 1985    Beginning/End Dates of Reporting Period:   2019 - 2019; eval only    Referring Provider: Mere Vaughn APRN CNP    Therapy Diagnosis: Dizziness     Client Self Report: See eval    Objective Measurements:  Objective Measure: DHI  Details:     Goals:  Goal Identifier DHI   Goal Description The patient will score < 4/100 on the DHI to demonstrate a significant improvement in dizziness symptoms severity.   Target Date 19   Date Met      Progress:     Goal Identifier Postural Stability   Goal Description Patient will be able to ambulate without significant postural instability while performing head turns in order to minimize risk for falls and improve tolerance for mobility tasks.   Target Date 19   Date Met      Progress:     Progress Toward Goals: unknown as patient failed to return for any additional visits.    Plan:  Discharge from therapy.    Discharge:    Reason for Discharge: Patient has failed to schedule further appointments.    Equipment Issued: N/A    Discharge Plan: N/A

## 2019-07-24 NOTE — ADDENDUM NOTE
Encounter addended by: Eri Nicole, PT on: 7/24/2019 10:23 AM   Actions taken: Sign clinical note, Episode edited, Episode resolved

## 2019-09-24 DIAGNOSIS — F41.1 GAD (GENERALIZED ANXIETY DISORDER): ICD-10-CM

## 2019-09-24 NOTE — TELEPHONE ENCOUNTER
Requested Prescriptions   Pending Prescriptions Disp Refills     escitalopram (LEXAPRO) 10 MG tablet 135 tablet 0     Sig: Take 1.5 tablets (15 mg) by mouth daily       There is no refill protocol information for this order        Last Written Prescription Date:  06/24/2019  Last Fill Quantity: 135,  # refills: 0   Last office visit: 6/24/2019 with prescribing provider:  omega Vaughn   Future Office Visit:

## 2019-09-24 NOTE — TELEPHONE ENCOUNTER
Per OV notes at patients last visit on 6/24/2019, patient was going to be moving.  RX request is coming from pharmacy in [place], [place].    Left a detailed message on voice mail for patient to call us back and let us know if he did indeed move out of state.      Patient needs to be seen for a refill if he is still living her in [place].    Cheryl Lockett RN

## 2019-09-30 DIAGNOSIS — F41.1 GAD (GENERALIZED ANXIETY DISORDER): ICD-10-CM

## 2019-09-30 NOTE — LETTER
[address], [address]  [facility] [address]  Phone: [phone]  Fax: [phone]    10/01/19    Jeffry Peoples  [address]  [facility] [address]      Dear Jeffry:       We recently received a request to refill your medication - escitalopram (LEXAPRO) 10 MG tablet.    A review of your chart indicates that an appointment is required with your provider for review mental health.     Please schedule an appointment for an office visit. You can schedule on line or call us at [phone].     We have authorized one refill of your medication to allow time for you to schedule your appointment.    Taking care of your health is important to us, and ongoing visits with your provider are vital to your care.  We look forward to seeing you in the near future.            Sincerely,      CHIDI Stafford CNP/Norma LIVINGSTON RN

## 2019-09-30 NOTE — TELEPHONE ENCOUNTER
"Requested Prescriptions   Pending Prescriptions Disp Refills     escitalopram (LEXAPRO) 10 MG tablet [Pharmacy Med Name: ESCITALOPRAM 10 MG TABLET] 135 tablet 0     Sig: TAKE 1.5 TABLETS (15 MG) BY MOUTH DAILY   Last Written Prescription Date:  6/24/19  Last Fill Quantity: 135,  # refills: 0   Last Office Visit: 6/24/2019 Strong      Return in about 2 months (around 8/24/2019) for anxiety .     Future Office Visit:         SSRIs Protocol Passed - 9/30/2019 11:44 AM   PHQ-9 SCORE 5/1/2019 5/22/2019   PHQ-9 Total Score 3 9     NJ-7 SCORE 5/1/2019 5/22/2019 6/24/2019   Total Score 15 15 10          Passed - Recent (12 mo) or future (30 days) visit within the authorizing provider's specialty     Patient has had an office visit with the authorizing provider or a provider within the authorizing providers department within the previous 12 mos or has a future within next 30 days. See \"Patient Info\" tab in inbasket, or \"Choose Columns\" in Meds & Orders section of the refill encounter.              Passed - Medication is active on med list        Passed - Patient is age 18 or older        "

## 2019-10-01 RX ORDER — ESCITALOPRAM OXALATE 10 MG/1
15 TABLET ORAL DAILY
Qty: 45 TABLET | Refills: 0 | Status: SHIPPED | OUTPATIENT
Start: 2019-10-01 | End: 2019-11-12

## 2019-10-01 NOTE — TELEPHONE ENCOUNTER
Medication is being filled for 1 time refill only due to:  pt is due for an appointment, letter sent to schedule appt within a month.

## 2019-10-04 NOTE — TELEPHONE ENCOUNTER
Please have submit evisit; dose was increased in June and he has not had follow up.  Needs updated NJ and PHQ--should be able to do these in his evisit.  Can manage this refill with an evisit, ultimately will need to find local provider but I know they just moved and are likely not established with a local provider yet.     Mere Vaughn CNP

## 2019-10-04 NOTE — TELEPHONE ENCOUNTER
Routing refill request to provider for review/approval because:  Pt moved to [place], unsure if he has established with new provider yet, requesting refill  Was due for follow up in August, Last OV 6.24.19    PHQ-9 SCORE 5/1/2019 5/22/2019   PHQ-9 Total Score 3 9     NJ-7 SCORE 5/1/2019 5/22/2019 6/24/2019   Total Score 15 15 10

## 2019-10-09 RX ORDER — ESCITALOPRAM OXALATE 10 MG/1
15 TABLET ORAL DAILY
Qty: 135 TABLET | Refills: 0 | OUTPATIENT
Start: 2019-10-09

## 2019-10-09 NOTE — TELEPHONE ENCOUNTER
This refill request is from 9/24/19    Pt has not followed and not responding to clinic    Jeffry Green RN, BSN

## 2019-11-01 DIAGNOSIS — F41.1 GAD (GENERALIZED ANXIETY DISORDER): ICD-10-CM

## 2019-11-01 NOTE — TELEPHONE ENCOUNTER
"Requested Prescriptions   Pending Prescriptions Disp Refills     escitalopram (LEXAPRO) 10 MG tablet [Pharmacy Med Name: ESCITALOPRAM 10 MG TABLET] 45 tablet 0     Sig: TAKE 1.5 TABLETS (15 MG) BY MOUTH DAILY . SEE PROVIDER FOR REFILL   Last Written Prescription Date:  10/1/19  Last Fill Quantity: 45,  # refills: 0   Last Office Visit: 6/24/2019   Future Office Visit:         SSRIs Protocol Passed - 11/1/2019  2:06 AM   PHQ-9 SCORE 5/1/2019 5/22/2019   PHQ-9 Total Score 3 9     [place]-7 SCORE 5/1/2019 5/22/2019 6/24/2019   Total Score 15 15 10          Passed - Recent (12 mo) or future (30 days) visit within the authorizing provider's specialty     Patient has had an office visit with the authorizing provider or a provider within the authorizing providers department within the previous 12 mos or has a future within next 30 days. See \"Patient Info\" tab in inbasket, or \"Choose Columns\" in Meds & Orders section of the refill encounter.              Passed - Medication is active on med list        Passed - Patient is age 18 or older        "

## 2019-11-02 RX ORDER — ESCITALOPRAM OXALATE 10 MG/1
15 TABLET ORAL DAILY
Qty: 45 TABLET | Refills: 0 | OUTPATIENT
Start: 2019-11-02

## 2019-11-12 RX ORDER — ESCITALOPRAM OXALATE 10 MG/1
15 TABLET ORAL DAILY
Qty: 135 TABLET | Refills: 0 | Status: SHIPPED | OUTPATIENT
Start: 2019-11-12

## 2020-03-10 ENCOUNTER — HEALTH MAINTENANCE LETTER (OUTPATIENT)
Age: 35
End: 2020-03-10

## 2020-12-27 ENCOUNTER — HEALTH MAINTENANCE LETTER (OUTPATIENT)
Age: 35
End: 2020-12-27

## 2021-04-24 ENCOUNTER — HEALTH MAINTENANCE LETTER (OUTPATIENT)
Age: 36
End: 2021-04-24

## 2021-10-09 ENCOUNTER — HEALTH MAINTENANCE LETTER (OUTPATIENT)
Age: 36
End: 2021-10-09

## 2022-05-16 ENCOUNTER — HEALTH MAINTENANCE LETTER (OUTPATIENT)
Age: 37
End: 2022-05-16

## 2022-09-11 ENCOUNTER — HEALTH MAINTENANCE LETTER (OUTPATIENT)
Age: 37
End: 2022-09-11

## 2023-06-03 ENCOUNTER — HEALTH MAINTENANCE LETTER (OUTPATIENT)
Age: 38
End: 2023-06-03